# Patient Record
(demographics unavailable — no encounter records)

---

## 2024-11-25 NOTE — HISTORY OF PRESENT ILLNESS
[Patient] : patient [FreeTextEntry1] : arthritis  CHF   oxygen  dependent  [FreeTextEntry2] : 81 year old female with   chronic diastolic heart failure  s/p CardioMEMs implant on  2/28, CAD , PCI mLAD and Lcx, HTN, HLD, hypothyroidism obesity, BMI 38.6, JUSTIN  on CPAP, colon ca 1992  with mets to left kidney and liver, underwent partial colectomy, left nephrectomy, partial liver resection 1993, h/o chemo  osteoarthritis of knees and shoulders, had bilateral knee  replacement  interval events reviewed   and addressed  today  patient is not feeling well  had fallen last week and seen in ER now  with significant knee pain and bruising  and swelling  no change with cardiac meds  constipation   none  on citrucel as per GI  incontinence  none  wears   depends  occasionally has  accidents and frequent UTI  and fungal rashes  pressure/bed sores none

## 2024-11-25 NOTE — REVIEW OF SYSTEMS
[Fatigue] : fatigue [Recent Change In Weight] : ~T recent weight change [Shortness Of Breath] : shortness of breath [Joint Pain] : joint pain [Joint Stiffness] : joint stiffness [Nail Changes] : nail changes [Unsteady Walking] : ataxia [Anxiety] : anxiety [Chest Pain] : no chest pain [Palpitations] : no palpitations [Suicidal] : not suicidal

## 2024-11-25 NOTE — PHYSICAL EXAM
[Normal Sclera/Conjunctiva] : normal sclera/conjunctiva [Normal Outer Ear/Nose] : the ears and nose were normal in appearance [Normal Oropharynx] : the oropharynx was normal [No JVD] : no jugular venous distention [No Respiratory Distress] : no respiratory distress [Breast Exam Declined] : patient declined to have breast exam done [Normal Bowel Sounds] : normal bowel sounds [Non Tender] : non-tender [Soft] : abdomen soft [Patient Refused] : rectal exam was refused by the patient [No CVA Tenderness] : no ~M costovertebral angle tenderness [Kyphosis] :  kyphosis present [No Skin Lesions] : no skin lesions [No Motor Deficits] : the motor exam was normal [Oriented x3] : oriented to person, place, and time [Normal Affect] : the affect was normal [Normal Mood] : the mood was normal [Foot Ulcers] : foot ulcers [de-identified] :   looking  stated age  but more tired and  pale  [de-identified] : wears glasses  [de-identified] : good air entry   [de-identified] : irregular  [de-identified] : antalgic  unsteady  decreased  strength and tone   right  leg  significant swelling and hematoma over  knee cap  tender to palpation ROM decreased  left foot crossed 2 toe and  bunion  1  + edema  [de-identified] :  2 toe bunion  under care f podiatry

## 2024-11-25 NOTE — PHYSICAL EXAM
[Normal Sclera/Conjunctiva] : normal sclera/conjunctiva [Normal Outer Ear/Nose] : the ears and nose were normal in appearance [Normal Oropharynx] : the oropharynx was normal [No JVD] : no jugular venous distention [No Respiratory Distress] : no respiratory distress [Breast Exam Declined] : patient declined to have breast exam done [Normal Bowel Sounds] : normal bowel sounds [Non Tender] : non-tender [Soft] : abdomen soft [Patient Refused] : rectal exam was refused by the patient [No CVA Tenderness] : no ~M costovertebral angle tenderness [Kyphosis] :  kyphosis present [No Skin Lesions] : no skin lesions [No Motor Deficits] : the motor exam was normal [Oriented x3] : oriented to person, place, and time [Normal Affect] : the affect was normal [Normal Mood] : the mood was normal [Foot Ulcers] : foot ulcers [de-identified] :   looking  stated age  but more tired and  pale  [de-identified] : wears glasses  [de-identified] : good air entry   [de-identified] : irregular  [de-identified] : antalgic  unsteady  decreased  strength and tone   right  leg  significant swelling and hematoma over  knee cap  tender to palpation ROM decreased  left foot crossed 2 toe and  bunion  1  + edema  [de-identified] :  2 toe bunion  under care f podiatry

## 2024-12-26 NOTE — DISCUSSION/SUMMARY
[de-identified] : The underlying pathophysiology was reviewed in great detail with the patient as well as the various treatment options, including ice, analgesics, NSAIDs, Physical therapy  Activity modifications and restrictions were discussed.    I have recommended utilizing a knee sleeve to provide added support and stability.  At this time, she will start a course of physical therapy for strengthening and flexibility. A prescription was provided.    FU 6 - 8 weeks  All questions were answered, all alternatives discussed and the patient is in complete agreement with that plan. Follow-up appointment as instructed. Any issues and the patient will call or come in sooner.

## 2024-12-26 NOTE — HISTORY OF PRESENT ILLNESS
[de-identified] : ALEXX JOHNSTON is a 81 year female presenting to the office complaining of Right knee pain. History of right and left total knee arthroplasty in the past.. Patient reports pain s/p fall 3 weeks ago. Pt with fall a few weeks ago, still having significant bruising and pain to multiple parts of body including knee, foot, hand and shoulders. She continues to have bruising and swelling of the right lower leg.  The patient describes the pain as a dull aching, and occasionally sharp pain localized to the anterior aspect of her knee that is intermittent in nature. Her symptoms are exacerbated with bending the knee, walking, standing from a seated position, and stairs. Pain is alleviated with rest. Patient denies instability, catching or locking of the knee. She states she is on 20mg of Prednisone prescribed by her PCP.  She also complains of left shoulder pain s/p the fall. Of note, PMHx of s/p right reverse total shoulder arthroplasty on 05/06/2022, chronic diastolic heart failure s/p CardioMEMs implant on 2/28, CAD , PCI mLAD and Lcx, HTN, HLD, hypothyroidism obesity, BMI 38.6, JUSTIN on CPAP, colon ca 1992 with mets to left kidney and liver, underwent partial colectomy, left nephrectomy, partial liver resection 1993, h/o chemo osteoarthritis of knees and shoulders, had bilateral knee replacement. Doppler from 05/05/23 of [facility] showed DVT in the right common femoral vein. She is on blood thinners.

## 2024-12-26 NOTE — DISCUSSION/SUMMARY
[de-identified] : The underlying pathophysiology was reviewed in great detail with the patient as well as the various treatment options, including ice, analgesics, NSAIDs, Physical therapy  Activity modifications and restrictions were discussed.    I have recommended utilizing a knee sleeve to provide added support and stability.  At this time, she will start a course of physical therapy for strengthening and flexibility. A prescription was provided.    FU 6 - 8 weeks  All questions were answered, all alternatives discussed and the patient is in complete agreement with that plan. Follow-up appointment as instructed. Any issues and the patient will call or come in sooner.

## 2024-12-26 NOTE — PHYSICAL EXAM
[Normal RLE] : Right Lower Extremity: No scars, rashes, lesions, ulcers, skin intact [Normal LLE] : Left Lower Extremity: No scars, rashes, lesions, ulcers, skin intact [Normal Touch] : sensation intact for touch [Normal] : Alert and in no acute distress [de-identified] : Right Lower Extremity o Knee :  Inspection/Palpation :  tenderness to palpation over anterior knee, + prepatellar bursal effusion, no deformity, resolving ecchymosis over the anterior smaller than prior exam, swelling over the posterior knee and leg, diffuse medial tenderness   Range of Motion : 0 - 90 degrees, no crepitus  Stability : no valgus or varus instability present on provocative testing, Lachmans Test (-)  Strength : flexion and extension 5/5 Additional tests: o Muscle Bulk : normal muscle bulk present o Skin   prepatellar hematoma, mild warmth to touch, no erythema, small abrasion over the anterior medial knee, diffuse ecchymosis over the anterior and medial knee  o Sensation : sensation to pin intact o Vascular Exam : no edema, no cyanosis, dorsalis pedis artery pulse 2+, posterior tibial artery pulse 2+ [de-identified] : _________________________ XR obtained on 12/05/2024 :   o Cervical Spine : AP and lateral views were obtained, there are no soft tissue abnormalities, no fractures, marked diffuse degenerative disc disease and spondylosis, normal bone density, no bony lesions.  o RIGHT Shoulder : Grashey, Axillary and Outlet views were obtained, there are no soft tissue abnormalities, no fractures, severe glenohumeral osteoarthritis with large inferior osteophyte formation, normal bone density, no bony lesions. _________________________ Patient comes to today's visit with outside imaging already performed. I reviewed the images in detail with the patient and discussed the findings as highlighted below.  ACC: [id number] EXAM: XR PELVIS AP ONLY 1-2 VIEWS ORDERED BY: PRICE JULES  PROCEDURE DATE: 11/17/2024  INTERPRETATION: Radiograph of the pelvis  CLINICAL INFORMATION: Injury with Pain.  TECHNIQUE: AP pelvic view.  FINDINGS: 10/28/2022 pelvic radiograph available for review.  Pelvic bones are intact. No fracture or hip dislocation. SI joints subchondral sclerosis. No pathologic calcifications. Soft tissues unremarkable.  Bowel overlies and obscures portions of the sacrum and iliac bone.  IMPRESSION: No acute radiographic osseous pathology.. If pain persists despite conservative therapy and patient is unable to walk, a follow-up noncontrast CT/MRI scan recommended to exclude occult fractures or soft tissue injuries not evident on plain film radiography.  --- End of Report ---   ACC: [id number] EXAM: XR KNEE COMP 4+ VIEWS RT ORDERED BY: PRICE JULES  PROCEDURE DATE: 11/17/2024    INTERPRETATION: HISTORY: RIGHT Knee pain following injury.  TECHNIQUE: AP, oblique and lateral views of the knee are submitted.  FINDINGS: Prepatellar soft tissue swelling. No joint effusion. Tricompartmental knee replacement with the femoral, tibial and patellar components in proper anatomic alignment. No fracture.  IMPRESSION: Knee prosthetic components in proper anatomical alignment. No fracture, dislocation or joint effusion.  --- End of Report ---     ACC: [id number] EXAM: XR WRIST COMP MIN 3 VIEWS RT ORDERED BY: PRICE JULES  ACC: [id number] EXAM: XR HAND MIN 3 VIEWS RT ORDERED BY: PRICE JULES  PROCEDURE DATE: 11/17/2024    INTERPRETATION: Radiographs of the RIGHT wrist and RIGHT hand  CLINICAL INFORMATION: Injury with Pain.  TECHNIQUE: Frontal, oblique and lateral views of the hand and wrist.  FINDINGS: RIGHT hand radiographs 3/12/2024 available for review.  No fracture.  Advanced osteoarthritis. The first through fifth DIP osteoarthritic joint space narrowing. Widened scapholunate interval indicating ligament tear. Radial navicular joint space sclerotic narrowing. Osteoarthrosis of the first metacarpal carpal joint space. No fracture No subcutaneous air or radiopaque foreign body..   IMPRESSION: Advanced osteoarthritis. No fracture.   --- End of Report ---      YARIEL VALDEZ MD; Attending Radiologist This document has been electronically signed. Nov 18 2024 2:03PM

## 2024-12-26 NOTE — HISTORY OF PRESENT ILLNESS
[de-identified] : ALEXX JOHNSTON is a 81 year female presenting to the office complaining of Right knee pain. History of right and left total knee arthroplasty in the past.. Patient reports pain s/p fall 3 weeks ago. Pt with fall a few weeks ago, still having significant bruising and pain to multiple parts of body including knee, foot, hand and shoulders. She continues to have bruising and swelling of the right lower leg.  The patient describes the pain as a dull aching, and occasionally sharp pain localized to the anterior aspect of her knee that is intermittent in nature. Her symptoms are exacerbated with bending the knee, walking, standing from a seated position, and stairs. Pain is alleviated with rest. Patient denies instability, catching or locking of the knee. She states she is on 20mg of Prednisone prescribed by her PCP.  She also complains of left shoulder pain s/p the fall. Of note, PMHx of s/p right reverse total shoulder arthroplasty on 05/06/2022, chronic diastolic heart failure s/p CardioMEMs implant on 2/28, CAD , PCI mLAD and Lcx, HTN, HLD, hypothyroidism obesity, BMI 38.6, JUSTIN on CPAP, colon ca 1992 with mets to left kidney and liver, underwent partial colectomy, left nephrectomy, partial liver resection 1993, h/o chemo osteoarthritis of knees and shoulders, had bilateral knee replacement. Doppler from 05/05/23 of [facility] showed DVT in the right common femoral vein. She is on blood thinners.

## 2024-12-26 NOTE — PHYSICAL EXAM
[Normal RLE] : Right Lower Extremity: No scars, rashes, lesions, ulcers, skin intact [Normal LLE] : Left Lower Extremity: No scars, rashes, lesions, ulcers, skin intact [Normal Touch] : sensation intact for touch [Normal] : Alert and in no acute distress [de-identified] : Right Lower Extremity o Knee :  Inspection/Palpation :  tenderness to palpation over anterior knee, + prepatellar bursal effusion, no deformity, resolving ecchymosis over the anterior smaller than prior exam, swelling over the posterior knee and leg, diffuse medial tenderness   Range of Motion : 0 - 90 degrees, no crepitus  Stability : no valgus or varus instability present on provocative testing, Lachmans Test (-)  Strength : flexion and extension 5/5 Additional tests: o Muscle Bulk : normal muscle bulk present o Skin   prepatellar hematoma, mild warmth to touch, no erythema, small abrasion over the anterior medial knee, diffuse ecchymosis over the anterior and medial knee  o Sensation : sensation to pin intact o Vascular Exam : no edema, no cyanosis, dorsalis pedis artery pulse 2+, posterior tibial artery pulse 2+ [de-identified] : _________________________ XR obtained on 12/05/2024 :   o Cervical Spine : AP and lateral views were obtained, there are no soft tissue abnormalities, no fractures, marked diffuse degenerative disc disease and spondylosis, normal bone density, no bony lesions.  o RIGHT Shoulder : Grashey, Axillary and Outlet views were obtained, there are no soft tissue abnormalities, no fractures, severe glenohumeral osteoarthritis with large inferior osteophyte formation, normal bone density, no bony lesions. _________________________ Patient comes to today's visit with outside imaging already performed. I reviewed the images in detail with the patient and discussed the findings as highlighted below.  ACC: [id number] EXAM: XR PELVIS AP ONLY 1-2 VIEWS ORDERED BY: PRICE JULES  PROCEDURE DATE: 11/17/2024  INTERPRETATION: Radiograph of the pelvis  CLINICAL INFORMATION: Injury with Pain.  TECHNIQUE: AP pelvic view.  FINDINGS: 10/28/2022 pelvic radiograph available for review.  Pelvic bones are intact. No fracture or hip dislocation. SI joints subchondral sclerosis. No pathologic calcifications. Soft tissues unremarkable.  Bowel overlies and obscures portions of the sacrum and iliac bone.  IMPRESSION: No acute radiographic osseous pathology.. If pain persists despite conservative therapy and patient is unable to walk, a follow-up noncontrast CT/MRI scan recommended to exclude occult fractures or soft tissue injuries not evident on plain film radiography.  --- End of Report ---   ACC: [id number] EXAM: XR KNEE COMP 4+ VIEWS RT ORDERED BY: PRICE JULES  PROCEDURE DATE: 11/17/2024    INTERPRETATION: HISTORY: RIGHT Knee pain following injury.  TECHNIQUE: AP, oblique and lateral views of the knee are submitted.  FINDINGS: Prepatellar soft tissue swelling. No joint effusion. Tricompartmental knee replacement with the femoral, tibial and patellar components in proper anatomic alignment. No fracture.  IMPRESSION: Knee prosthetic components in proper anatomical alignment. No fracture, dislocation or joint effusion.  --- End of Report ---     ACC: [id number] EXAM: XR WRIST COMP MIN 3 VIEWS RT ORDERED BY: PRICE JULES  ACC: [id number] EXAM: XR HAND MIN 3 VIEWS RT ORDERED BY: PRICE JULES  PROCEDURE DATE: 11/17/2024    INTERPRETATION: Radiographs of the RIGHT wrist and RIGHT hand  CLINICAL INFORMATION: Injury with Pain.  TECHNIQUE: Frontal, oblique and lateral views of the hand and wrist.  FINDINGS: RIGHT hand radiographs 3/12/2024 available for review.  No fracture.  Advanced osteoarthritis. The first through fifth DIP osteoarthritic joint space narrowing. Widened scapholunate interval indicating ligament tear. Radial navicular joint space sclerotic narrowing. Osteoarthrosis of the first metacarpal carpal joint space. No fracture No subcutaneous air or radiopaque foreign body..   IMPRESSION: Advanced osteoarthritis. No fracture.   --- End of Report ---      YARIEL VALDEZ MD; Attending Radiologist This document has been electronically signed. Nov 18 2024 2:03PM